# Patient Record
Sex: MALE | Employment: FULL TIME | URBAN - METROPOLITAN AREA
[De-identification: names, ages, dates, MRNs, and addresses within clinical notes are randomized per-mention and may not be internally consistent; named-entity substitution may affect disease eponyms.]

---

## 2024-02-09 ENCOUNTER — OFFICE VISIT (OUTPATIENT)
Dept: PRIMARY CARE CLINIC | Age: 44
End: 2024-02-09

## 2024-02-09 VITALS
BODY MASS INDEX: 34.39 KG/M2 | DIASTOLIC BLOOD PRESSURE: 80 MMHG | HEART RATE: 82 BPM | HEIGHT: 66 IN | WEIGHT: 214 LBS | SYSTOLIC BLOOD PRESSURE: 130 MMHG | OXYGEN SATURATION: 98 %

## 2024-02-09 DIAGNOSIS — E66.09 CLASS 1 OBESITY DUE TO EXCESS CALORIES WITHOUT SERIOUS COMORBIDITY WITH BODY MASS INDEX (BMI) OF 34.0 TO 34.9 IN ADULT: ICD-10-CM

## 2024-02-09 DIAGNOSIS — L98.9 SCALP LESION: Primary | ICD-10-CM

## 2024-02-09 DIAGNOSIS — Z00.00 WELL ADULT EXAM: ICD-10-CM

## 2024-02-09 DIAGNOSIS — B35.1 ONYCHOMYCOSIS: ICD-10-CM

## 2024-02-09 LAB
BASOPHILS # BLD: 0 K/UL (ref 0–0.2)
BASOPHILS NFR BLD: 0.8 %
DEPRECATED RDW RBC AUTO: 13.4 % (ref 12.4–15.4)
EOSINOPHIL # BLD: 0.1 K/UL (ref 0–0.6)
EOSINOPHIL NFR BLD: 2.1 %
HCT VFR BLD AUTO: 44.2 % (ref 40.5–52.5)
HGB BLD-MCNC: 14.5 G/DL (ref 13.5–17.5)
LYMPHOCYTES # BLD: 1.5 K/UL (ref 1–5.1)
LYMPHOCYTES NFR BLD: 32.6 %
MCH RBC QN AUTO: 28.2 PG (ref 26–34)
MCHC RBC AUTO-ENTMCNC: 32.7 G/DL (ref 31–36)
MCV RBC AUTO: 86.1 FL (ref 80–100)
MONOCYTES # BLD: 0.5 K/UL (ref 0–1.3)
MONOCYTES NFR BLD: 10.5 %
NEUTROPHILS # BLD: 2.5 K/UL (ref 1.7–7.7)
NEUTROPHILS NFR BLD: 54 %
PLATELET # BLD AUTO: 208 K/UL (ref 135–450)
PMV BLD AUTO: 9 FL (ref 5–10.5)
RBC # BLD AUTO: 5.13 M/UL (ref 4.2–5.9)
WBC # BLD AUTO: 4.6 K/UL (ref 4–11)

## 2024-02-09 SDOH — ECONOMIC STABILITY: HOUSING INSECURITY
IN THE LAST 12 MONTHS, WAS THERE A TIME WHEN YOU DID NOT HAVE A STEADY PLACE TO SLEEP OR SLEPT IN A SHELTER (INCLUDING NOW)?: NO

## 2024-02-09 SDOH — ECONOMIC STABILITY: FOOD INSECURITY: WITHIN THE PAST 12 MONTHS, YOU WORRIED THAT YOUR FOOD WOULD RUN OUT BEFORE YOU GOT MONEY TO BUY MORE.: NEVER TRUE

## 2024-02-09 SDOH — ECONOMIC STABILITY: FOOD INSECURITY: WITHIN THE PAST 12 MONTHS, THE FOOD YOU BOUGHT JUST DIDN'T LAST AND YOU DIDN'T HAVE MONEY TO GET MORE.: NEVER TRUE

## 2024-02-09 SDOH — ECONOMIC STABILITY: INCOME INSECURITY: HOW HARD IS IT FOR YOU TO PAY FOR THE VERY BASICS LIKE FOOD, HOUSING, MEDICAL CARE, AND HEATING?: NOT HARD AT ALL

## 2024-02-09 ASSESSMENT — ANXIETY QUESTIONNAIRES
3. WORRYING TOO MUCH ABOUT DIFFERENT THINGS: 0
5. BEING SO RESTLESS THAT IT IS HARD TO SIT STILL: 0
2. NOT BEING ABLE TO STOP OR CONTROL WORRYING: 0
IF YOU CHECKED OFF ANY PROBLEMS ON THIS QUESTIONNAIRE, HOW DIFFICULT HAVE THESE PROBLEMS MADE IT FOR YOU TO DO YOUR WORK, TAKE CARE OF THINGS AT HOME, OR GET ALONG WITH OTHER PEOPLE: NOT DIFFICULT AT ALL
GAD7 TOTAL SCORE: 0
1. FEELING NERVOUS, ANXIOUS, OR ON EDGE: 0
7. FEELING AFRAID AS IF SOMETHING AWFUL MIGHT HAPPEN: 0
4. TROUBLE RELAXING: 0
6. BECOMING EASILY ANNOYED OR IRRITABLE: 0

## 2024-02-09 ASSESSMENT — PATIENT HEALTH QUESTIONNAIRE - PHQ9
2. FEELING DOWN, DEPRESSED OR HOPELESS: 0
1. LITTLE INTEREST OR PLEASURE IN DOING THINGS: 0
SUM OF ALL RESPONSES TO PHQ QUESTIONS 1-9: 0
SUM OF ALL RESPONSES TO PHQ9 QUESTIONS 1 & 2: 0
SUM OF ALL RESPONSES TO PHQ QUESTIONS 1-9: 0

## 2024-02-09 ASSESSMENT — ENCOUNTER SYMPTOMS
EYE PAIN: 0
COUGH: 0
SORE THROAT: 0
ABDOMINAL PAIN: 0
SHORTNESS OF BREATH: 0

## 2024-02-09 NOTE — PROGRESS NOTES
Difficulty of Paying Living Expenses: Not hard at all   Food Insecurity: No Food Insecurity (2/9/2024)    Hunger Vital Sign     Worried About Running Out of Food in the Last Year: Never true     Ran Out of Food in the Last Year: Never true   Transportation Needs: Unknown (2/9/2024)    PRAPARE - Transportation     Lack of Transportation (Medical): Not on file     Lack of Transportation (Non-Medical): No   Physical Activity: Not on file   Stress: Not on file   Social Connections: Not on file   Intimate Partner Violence: Not on file   Housing Stability: Unknown (2/9/2024)    Housing Stability Vital Sign     Unable to Pay for Housing in the Last Year: Not on file     Number of Places Lived in the Last Year: Not on file     Unstable Housing in the Last Year: No      History reviewed. No pertinent past medical history.   Past Surgical History:   Procedure Laterality Date    LYMPH NODE BIOPSY  1986    Neck        ASSESSMENT/PLAN:  1. Scalp lesion  - referred to derm for further evaluation  - [facility] - Flaquito Pereira MD, Dermatology, [facility]    2. Onychomycosis  - check LFTs prior to treatment with terbinafine  - if normal, send terbinafine to pharmacy for 6 week treatment    3. Well adult exam  - counseled on healthy diet, regular exercise  - Hemoglobin A1C  - Lipid, Fasting  - Comprehensive Metabolic Panel  - CBC with Auto Differential    4. Class 1 obesity due to excess calories without serious comorbidity with body mass index (BMI) of 34.0 to 34.9 in adult  - Hemoglobin A1C  - Lipid, Fasting  - Comprehensive Metabolic Panel  - CBC with Auto Differential       No follow-ups on file.    Electronically signed by Lolis Latif MD on 2/9/2024 at 2:55 PM

## 2024-02-10 LAB
ALBUMIN SERPL-MCNC: 4.7 G/DL (ref 3.4–5)
ALBUMIN/GLOB SERPL: 1.7 {RATIO} (ref 1.1–2.2)
ALP SERPL-CCNC: 59 U/L (ref 40–129)
ALT SERPL-CCNC: 25 U/L (ref 10–40)
ANION GAP SERPL CALCULATED.3IONS-SCNC: 11 MMOL/L (ref 3–16)
AST SERPL-CCNC: 25 U/L (ref 15–37)
BILIRUB SERPL-MCNC: 0.8 MG/DL (ref 0–1)
BUN SERPL-MCNC: 17 MG/DL (ref 7–20)
CALCIUM SERPL-MCNC: 9.1 MG/DL (ref 8.3–10.6)
CHLORIDE SERPL-SCNC: 104 MMOL/L (ref 99–110)
CHOLEST SERPL-MCNC: 190 MG/DL (ref 0–199)
CO2 SERPL-SCNC: 28 MMOL/L (ref 21–32)
CREAT SERPL-MCNC: 0.9 MG/DL (ref 0.9–1.3)
EST. AVERAGE GLUCOSE BLD GHB EST-MCNC: 96.8 MG/DL
GFR SERPLBLD CREATININE-BSD FMLA CKD-EPI: >60 ML/MIN/{1.73_M2}
GLUCOSE SERPL-MCNC: 69 MG/DL (ref 70–99)
HBA1C MFR BLD: 5 %
HDLC SERPL-MCNC: 31 MG/DL (ref 40–60)
LDL CHOLESTEROL CALCULATED: 125 MG/DL
POTASSIUM SERPL-SCNC: 3.6 MMOL/L (ref 3.5–5.1)
PROT SERPL-MCNC: 7.4 G/DL (ref 6.4–8.2)
SODIUM SERPL-SCNC: 143 MMOL/L (ref 136–145)
TRIGL SERPL-MCNC: 169 MG/DL (ref 0–150)
VLDLC SERPL CALC-MCNC: 34 MG/DL